# Patient Record
Sex: FEMALE | Race: WHITE | Employment: UNEMPLOYED | URBAN - METROPOLITAN AREA
[De-identification: names, ages, dates, MRNs, and addresses within clinical notes are randomized per-mention and may not be internally consistent; named-entity substitution may affect disease eponyms.]

---

## 2019-09-14 ENCOUNTER — APPOINTMENT (OUTPATIENT)
Dept: GENERAL RADIOLOGY | Age: 39
End: 2019-09-14

## 2019-09-14 ENCOUNTER — HOSPITAL ENCOUNTER (EMERGENCY)
Age: 39
Discharge: HOME | End: 2019-09-14
Attending: EMERGENCY MEDICINE

## 2019-09-14 VITALS
OXYGEN SATURATION: 100 % | RESPIRATION RATE: 12 BRPM | WEIGHT: 147 LBS | DIASTOLIC BLOOD PRESSURE: 85 MMHG | HEART RATE: 67 BPM | BODY MASS INDEX: 24.49 KG/M2 | SYSTOLIC BLOOD PRESSURE: 100 MMHG | TEMPERATURE: 98 F | HEIGHT: 65 IN

## 2019-09-14 DIAGNOSIS — S90.31XA CONTUSION OF RIGHT FOOT, INITIAL ENCOUNTER: Primary | ICD-10-CM

## 2019-09-14 PROCEDURE — 73610 X-RAY EXAM OF ANKLE: CPT

## 2019-09-14 PROCEDURE — 6370000000 HC RX 637 (ALT 250 FOR IP): Performed by: EMERGENCY MEDICINE

## 2019-09-14 PROCEDURE — 73630 X-RAY EXAM OF FOOT: CPT

## 2019-09-14 PROCEDURE — 99283 EMERGENCY DEPT VISIT LOW MDM: CPT

## 2019-09-14 RX ORDER — OXYCODONE HYDROCHLORIDE AND ACETAMINOPHEN 5; 325 MG/1; MG/1
1 TABLET ORAL ONCE
Status: COMPLETED | OUTPATIENT
Start: 2019-09-14 | End: 2019-09-14

## 2019-09-14 RX ADMIN — OXYCODONE HYDROCHLORIDE AND ACETAMINOPHEN 1 TABLET: 5; 325 TABLET ORAL at 20:33

## 2019-09-14 ASSESSMENT — PAIN SCALES - GENERAL
PAINLEVEL_OUTOF10: 5
PAINLEVEL_OUTOF10: 5

## 2019-09-15 NOTE — ED PROVIDER NOTES
Emergency Department Attending Note    Onelia Parikh MD    Date of ED VIsit: 9/14/2019    CHIEF COMPLAINT  Foot Injury (Pt states she rolled foot underneath of her while trying to get out of bed on Tuesday. )      HISTORY OF PRESENT ILLNESS  Brittany Reynolds is a 46 y.o. female  With Vital signs of /85   Pulse 67   Temp 98 °F (36.7 °C) (Oral)   Resp 12   Ht 5' 5\" (1.651 m)   Wt 147 lb (66.7 kg)   SpO2 100%   BMI 24.46 kg/m²  who presents to the ED with a complaint of right foot pain. Patient seen and evaluated in room 7. Patient states she woke up on Tuesday and apparently her foot was asleep she got up and started to walk and tripped over her foot causing pain in the foot. Of the pain is been getting worse as well has the swelling since then and the pain got so much today that she decided to come in and get seen in the emergency department for evaluation. She is on no exact and she does not know exactly how the injury occurred. She has no other injuries to the rest of her body. .  No other complaints, modifying factors or associated symptoms. Patients Past medical history reviewed and listed below  Past Medical History:   Diagnosis Date    Anxiety     Pneumonia     Psychiatric problem      Past Surgical History:   Procedure Laterality Date    EYE SURGERY      HYSTERECTOMY         I have reviewed the following from the nursing documentation. History reviewed. No pertinent family history.   Social History     Socioeconomic History    Marital status:      Spouse name: Not on file    Number of children: Not on file    Years of education: Not on file    Highest education level: Not on file   Occupational History    Not on file   Social Needs    Financial resource strain: Not on file    Food insecurity:     Worry: Not on file     Inability: Not on file    Transportation needs:     Medical: Not on file     Non-medical: Not on file   Tobacco Use    Smoking status: Current Every